# Patient Record
Sex: MALE | Race: WHITE | URBAN - METROPOLITAN AREA
[De-identification: names, ages, dates, MRNs, and addresses within clinical notes are randomized per-mention and may not be internally consistent; named-entity substitution may affect disease eponyms.]

---

## 2018-07-18 ENCOUNTER — OFFICE VISIT (OUTPATIENT)
Dept: FAMILY MEDICINE CLINIC | Age: 23
End: 2018-07-18

## 2018-07-18 VITALS
WEIGHT: 155 LBS | TEMPERATURE: 96.1 F | DIASTOLIC BLOOD PRESSURE: 60 MMHG | RESPIRATION RATE: 17 BRPM | SYSTOLIC BLOOD PRESSURE: 114 MMHG | OXYGEN SATURATION: 98 % | HEART RATE: 70 BPM | BODY MASS INDEX: 22.96 KG/M2 | HEIGHT: 69 IN

## 2018-07-18 DIAGNOSIS — Z02.89 HISTORY AND PHYSICAL EXAMINATION, IMMIGRATION: Primary | ICD-10-CM

## 2018-07-18 DIAGNOSIS — Z02.89 HISTORY AND PHYSICAL EXAMINATION, IMMIGRATION: ICD-10-CM

## 2018-07-18 NOTE — PROGRESS NOTES
Renetta Sharma is a 23 y.o.  male and presents with initial immigration physical. He denies any Class A or B conditions. No current complaints. Subjective: Additional Concerns: none     Allergies not on file  No past medical history on file. No past surgical history on file. No family history on file.   Social History   Substance Use Topics    Smoking status: Not on file    Smokeless tobacco: Not on file    Alcohol use Not on file     ROS     General: negative for - chills, fatigue, fever, weight change  Psych: negative for - anxiety, depression, irritability or mood swings  ENT: negative for - headaches, hearing change, nasal congestion, oral lesions, sneezing or sore throat  Heme/ Lymph: negative for - bleeding problems, bruising, pallor or swollen lymph nodes  Endo: negative for - hot flashes, polydipsia/polyuria or temperature intolerance  Resp: negative for - cough, shortness of breath or wheezing  CV: negative for - chest pain, edema or palpitations  GI: negative for - abdominal pain, change in bowel habits, constipation, diarrhea or nausea/vomiting  : negative for - dysuria, hematuria, incontinence, pelvic pain or vulvar/vaginal symptoms  MSK: negative for - joint pain, joint swelling or muscle pain  Neuro: negative for - confusion, headaches, seizures or weakness  Derm: negative for - dry skin, hair changes, rash or skin lesion changes    Objective:  Vitals:    07/18/18 1345   BP: 114/60   Pulse: 70   Resp: 17   Temp: 96.1 °F (35.6 °C)   TempSrc: Oral   SpO2: 98%   Weight: 155 lb (70.3 kg)   Height: 5' 9\" (1.753 m)   PainSc:   0 - No pain     PE    alert, well appearing, and in no distress, oriented to person, place, and time and normal appearing weight  General appearance - alert, well appearing, and in no distress, oriented to person, place, and time and normal appearing weight  Mental status - alert, oriented to person, place, and time, normal mood, behavior, speech, dress, motor activity, and thought processes  Chest - clear to auscultation, no wheezes, rales or rhonchi, symmetric air entry  Heart - normal rate, regular rhythm, normal S1, S2, no murmurs, rubs, clicks or gallops  Extremities - peripheral pulses normal, no pedal edema, no clubbing or cyanosis    LABS   No results found for any previous visit. TESTS  No results found for this or any previous visit. Assessment/Plan:       History and physical examination, immigration - No class A or B conditions. - QUANTIFERON TB GOLD(CLIENT INCUB.); Future  - N GONORRHOEA AMPLIFICATION; Future  Syphilis screening     Lab review: orders written for new lab studies as appropriate; see orders. I have discussed the diagnosis with the patient and the intended plan as seen in the above orders. The patient has received an after-visit summary and questions were answered concerning future plans. I have discussed medication side effects and warnings with the patient as well. I have reviewed the plan of care with the patient, accepted their input and they are in agreement with the treatment goals. F/U as needed.      Art Prajapati MD

## 2018-07-18 NOTE — MR AVS SNAPSHOT
[address] [address] 
[phone] Patient: Sina Dwyer MRN: [id number] :1995 Visit Information Date & Time Provider Department Dept. Phone Encounter #  
 2018  1:30 PM Tiffani Layne MD [facility] [phone] [id number] Upcoming Health Maintenance Date Due DTaP/Tdap/Td series (1 - Tdap) 2014 Influenza Age 5 to Adult 2018 Allergies as of 2018  Never Reviewed Not on File Current Immunizations  Never Reviewed No immunizations on file. Not reviewed this visit You Were Diagnosed With   
  
 Codes Comments History and physical examination, immigration    -  Primary ICD-10-CM: Z02.89 ICD-9-CM: V70.3 Vitals BP Pulse Temp Resp Height(growth percentile) Weight(growth percentile) 114/60 70 96.1 °F (35.6 °C) (Oral) 17 5' 9\" (1.753 m) 155 lb (70.3 kg) SpO2 BMI 98% 22.89 kg/m2 BMI and BSA Data Body Mass Index Body Surface Area  
 22.89 kg/m 2 1.85 m 2 Your Updated Medication List  
  
Notice  As of 2018  2:25 PM  
 You have not been prescribed any medications. We Performed the Following N GONORRHOEA AMPLIFICATION [id number] CPT(R)] QUANTIFERON TB GOLD(CLIENT INCUB.) C050973 CPT(R)] To-Do List   
 2018 Lab:  N GONORRHOEA AMPLIFICATION   
  
 2018 Lab:  QUANTIFERON TB GOLD(CLIENT INCUB.)   
  
 2018 Lab:  [facility] Introducing [facility]! [facility] introduces [employer] patient portal. Now you can access parts of your medical record, email your doctor's office, and request medication refills online. 1. In your internet browser, go to [email or web address] 2. Click on the First Time User? Click Here link in the Sign In box. You will see the New Member Sign Up page. 3. Enter your [employer] Access Code exactly as it appears below. You will not need to use this code after youve completed the sign-up process. If you do not sign up before the expiration date, you must request a new code. · [employer] Access Code: [email or web address] Expires: 10/16/2018  2:25 PM 
 
4. Enter the last four digits of your Social Security Number (xxxx) and Date of Birth (mm/dd/yyyy) as indicated and click Submit. You will be taken to the next sign-up page. 5. Create a [employer] ID. This will be your [employer] login ID and cannot be changed, so think of one that is secure and easy to remember. 6. Create a [employer] password. You can change your password at any time. 7. Enter your Password Reset Question and Answer. This can be used at a later time if you forget your password. 8. Enter your e-mail address. You will receive e-mail notification when new information is available in [address]. 9. Click Sign Up. You can now view and download portions of your medical record. 10. Click the Download Summary menu link to download a portable copy of your medical information. If you have questions, please visit the Frequently Asked Questions section of the [employer] website. Remember, [employer] is NOT to be used for urgent needs. For medical emergencies, dial 911. Now available from your iPhone and Android! Please provide this summary of care documentation to your next provider. If you have any questions after today's visit, please call [phone].

## 2018-07-19 NOTE — PATIENT INSTRUCTIONS
